# Patient Record
Sex: MALE | Race: WHITE | NOT HISPANIC OR LATINO | Employment: FULL TIME | URBAN - METROPOLITAN AREA
[De-identification: names, ages, dates, MRNs, and addresses within clinical notes are randomized per-mention and may not be internally consistent; named-entity substitution may affect disease eponyms.]

---

## 2023-10-20 ENCOUNTER — HOSPITAL ENCOUNTER (EMERGENCY)
Facility: HOSPITAL | Age: 53
Discharge: HOME | End: 2023-10-20
Attending: STUDENT IN AN ORGANIZED HEALTH CARE EDUCATION/TRAINING PROGRAM
Payer: COMMERCIAL

## 2023-10-20 ENCOUNTER — APPOINTMENT (OUTPATIENT)
Dept: RADIOLOGY | Facility: HOSPITAL | Age: 53
End: 2023-10-20
Payer: COMMERCIAL

## 2023-10-20 VITALS
HEIGHT: 73 IN | OXYGEN SATURATION: 97 % | TEMPERATURE: 97.3 F | BODY MASS INDEX: 41.75 KG/M2 | DIASTOLIC BLOOD PRESSURE: 77 MMHG | SYSTOLIC BLOOD PRESSURE: 149 MMHG | WEIGHT: 315 LBS | HEART RATE: 87 BPM | RESPIRATION RATE: 18 BRPM

## 2023-10-20 DIAGNOSIS — M25.561 ACUTE PAIN OF RIGHT KNEE: Primary | ICD-10-CM

## 2023-10-20 PROCEDURE — 2500000001 HC RX 250 WO HCPCS SELF ADMINISTERED DRUGS (ALT 637 FOR MEDICARE OP): Performed by: STUDENT IN AN ORGANIZED HEALTH CARE EDUCATION/TRAINING PROGRAM

## 2023-10-20 PROCEDURE — 73564 X-RAY EXAM KNEE 4 OR MORE: CPT | Mod: RT,FY

## 2023-10-20 PROCEDURE — 99283 EMERGENCY DEPT VISIT LOW MDM: CPT | Mod: 25 | Performed by: STUDENT IN AN ORGANIZED HEALTH CARE EDUCATION/TRAINING PROGRAM

## 2023-10-20 PROCEDURE — 73564 X-RAY EXAM KNEE 4 OR MORE: CPT | Mod: RIGHT SIDE | Performed by: RADIOLOGY

## 2023-10-20 RX ORDER — ACETAMINOPHEN 325 MG/1
975 TABLET ORAL ONCE
Status: COMPLETED | OUTPATIENT
Start: 2023-10-20 | End: 2023-10-20

## 2023-10-20 RX ORDER — CYCLOBENZAPRINE HCL 10 MG
10 TABLET ORAL 2 TIMES DAILY PRN
Qty: 5 TABLET | Refills: 0 | Status: SHIPPED | OUTPATIENT
Start: 2023-10-20 | End: 2023-10-23

## 2023-10-20 RX ORDER — CYCLOBENZAPRINE HCL 10 MG
5 TABLET ORAL ONCE
Status: COMPLETED | OUTPATIENT
Start: 2023-10-20 | End: 2023-10-20

## 2023-10-20 RX ORDER — ACETAMINOPHEN 325 MG/1
650 TABLET ORAL EVERY 6 HOURS PRN
Qty: 30 TABLET | Refills: 0 | Status: SHIPPED | OUTPATIENT
Start: 2023-10-20

## 2023-10-20 RX ADMIN — ACETAMINOPHEN 975 MG: 325 TABLET ORAL at 07:27

## 2023-10-20 RX ADMIN — CYCLOBENZAPRINE 5 MG: 10 TABLET, FILM COATED ORAL at 07:27

## 2023-10-20 ASSESSMENT — PAIN DESCRIPTION - ORIENTATION: ORIENTATION: RIGHT

## 2023-10-20 ASSESSMENT — PAIN DESCRIPTION - FREQUENCY: FREQUENCY: INTERMITTENT

## 2023-10-20 ASSESSMENT — PAIN DESCRIPTION - ONSET: ONSET: SUDDEN

## 2023-10-20 ASSESSMENT — PAIN - FUNCTIONAL ASSESSMENT: PAIN_FUNCTIONAL_ASSESSMENT: 0-10

## 2023-10-20 ASSESSMENT — COLUMBIA-SUICIDE SEVERITY RATING SCALE - C-SSRS
6. HAVE YOU EVER DONE ANYTHING, STARTED TO DO ANYTHING, OR PREPARED TO DO ANYTHING TO END YOUR LIFE?: NO
2. HAVE YOU ACTUALLY HAD ANY THOUGHTS OF KILLING YOURSELF?: NO
1. IN THE PAST MONTH, HAVE YOU WISHED YOU WERE DEAD OR WISHED YOU COULD GO TO SLEEP AND NOT WAKE UP?: NO

## 2023-10-20 ASSESSMENT — PAIN SCALES - GENERAL: PAINLEVEL_OUTOF10: 5 - MODERATE PAIN

## 2023-10-20 ASSESSMENT — PAIN DESCRIPTION - PROGRESSION: CLINICAL_PROGRESSION: NOT CHANGED

## 2023-10-20 ASSESSMENT — PAIN DESCRIPTION - LOCATION: LOCATION: KNEE

## 2023-10-20 ASSESSMENT — PAIN DESCRIPTION - PAIN TYPE: TYPE: CHRONIC PAIN

## 2023-10-20 NOTE — Clinical Note
Oscar Mcghee was seen and treated in our emergency department on 10/20/2023.  He may return to work on 10/22/2023.       If you have any questions or concerns, please don't hesitate to call.      Raul Villa MD

## 2023-10-20 NOTE — ED PROVIDER NOTES
HPI   Chief Complaint   Patient presents with   • Knee Pain     Pt BIB self for 5/10 right knee and thigh pain that started this morning while getting ready for work, Pt states Hx of chronic knee pain since injury at work and this morning is experiencing reduced gait and ROM. Pt complains of swelling, redness and tenderness on palpation.        This is a 53-year-old male presenting to the emergency department for right knee pain.  Patient states he has pain to the right knee since a fall approximately 1 month ago.  Patient fell at that time after he tripped in a pothole at work.  States he fell onto the right knee.  He has been able to ambulate though the last couple days the pain has worsened.  Pain is worse when he is standing up or sitting down and has some mild improvement when walking.  Patient does have history of a meniscal tear to the left knee and states the pain felt similar.  The pain is mainly to the medial knee.  Denies any numbness or weakness to the lower extremity.  Denies hitting his head or being on blood thinners with the initial fall.  Endorses intermittent swelling that comes and goes at home.  He has been taking naproxen for pain control.      History provided by:  Patient   used: No                        No data recorded                Patient History   History reviewed. No pertinent past medical history.  History reviewed. No pertinent surgical history.  No family history on file.  Social History     Tobacco Use   • Smoking status: Not on file   • Smokeless tobacco: Not on file   Substance Use Topics   • Alcohol use: Not on file   • Drug use: Not on file       Physical Exam   ED Triage Vitals [10/20/23 0518]   Temp Heart Rate Resp BP   36 °C (96.8 °F) 86 18 140/80      SpO2 Temp Source Heart Rate Source Patient Position   96 % Temporal Monitor Sitting      BP Location FiO2 (%)     Right arm --       Physical Exam  GEN: well appearing, no acute distress  EXT: Right knee  with small effusion, no overlying erythema, no laxity, mild tenderness to palpation medial joint line, end range of motion with flexion somewhat limited at the knee, no tenderness palpation to hip or ankle, no deformity, 2+ periph pulses in bilat radial and DP   NEURO: no focal deficits, no facial asymmetry, moving all extremities  PSYCH: AAOx3 answers questions appropriately    ED Course & MDM   ED Course as of 10/20/23 0844   Fri Oct 20, 2023   0843 X-ray of patient's knee does show degenerative changes without acute fracture or injury.  There is a small suprapatellar effusion.  Patient is able to ambulate in the emergency department.  I do feel that he is safe for discharge home.  He will be given orthopedic follow-up as necessary.  He will also be discharged home with a prescription for cyclobenzaprine. [DE]      ED Course User Index  [DE] Raul Villa MD       Medical Decision Making  This is a 53-year-old male presenting to the emergency department for right knee pain.  Patient stable upon presentation to the emergency department, no acute distress and vitals are unremarkable.  On exam patient has mild tenderness palpation to the medial joint of the knee.  There is a small effusion but otherwise no signs of deformity or overlying erythema.  Patient is neurovascular intact in the lower extremities.  Patient's symptoms do seem consistent with meniscal/soft tissue injury.  Have low suspicion for bony abnormality.  Symptoms not consistent with septic joint and I have no concern at this time.  He is still ambulatory on his knee.  Symptoms are improved when he had an Ace wrap and he is already taking naproxen.  We discussed adding Tylenol to his regimen and patient is requesting a muscle relaxer which will be provided.  X-rays to be performed today for bony injury.  If negative I do feel the patient is safe for discharge home with plans for orthopedic follow-up as necessary.    Procedure  Procedures     Raul Villa  MD  10/20/23 0844

## 2023-12-03 ENCOUNTER — HOSPITAL ENCOUNTER (EMERGENCY)
Facility: HOSPITAL | Age: 53
Discharge: HOME | End: 2023-12-03
Attending: STUDENT IN AN ORGANIZED HEALTH CARE EDUCATION/TRAINING PROGRAM
Payer: COMMERCIAL

## 2023-12-03 VITALS
SYSTOLIC BLOOD PRESSURE: 138 MMHG | TEMPERATURE: 98.4 F | OXYGEN SATURATION: 99 % | WEIGHT: 315 LBS | DIASTOLIC BLOOD PRESSURE: 78 MMHG | BODY MASS INDEX: 41.75 KG/M2 | RESPIRATION RATE: 20 BRPM | HEIGHT: 73 IN | HEART RATE: 87 BPM

## 2023-12-03 DIAGNOSIS — J01.00 ACUTE NON-RECURRENT MAXILLARY SINUSITIS: Primary | ICD-10-CM

## 2023-12-03 DIAGNOSIS — R05.1 ACUTE COUGH: ICD-10-CM

## 2023-12-03 LAB
FLUAV RNA RESP QL NAA+PROBE: NOT DETECTED
FLUBV RNA RESP QL NAA+PROBE: NOT DETECTED
SARS-COV-2 RNA RESP QL NAA+PROBE: NOT DETECTED

## 2023-12-03 PROCEDURE — 87636 SARSCOV2 & INF A&B AMP PRB: CPT | Performed by: PHYSICIAN ASSISTANT

## 2023-12-03 PROCEDURE — 99283 EMERGENCY DEPT VISIT LOW MDM: CPT | Performed by: STUDENT IN AN ORGANIZED HEALTH CARE EDUCATION/TRAINING PROGRAM

## 2023-12-03 RX ORDER — DOXYCYCLINE 100 MG/1
100 CAPSULE ORAL 2 TIMES DAILY
Qty: 14 CAPSULE | Refills: 0 | Status: SHIPPED | OUTPATIENT
Start: 2023-12-03 | End: 2023-12-10

## 2023-12-03 ASSESSMENT — LIFESTYLE VARIABLES
HAVE PEOPLE ANNOYED YOU BY CRITICIZING YOUR DRINKING: NO
EVER FELT BAD OR GUILTY ABOUT YOUR DRINKING: NO
EVER HAD A DRINK FIRST THING IN THE MORNING TO STEADY YOUR NERVES TO GET RID OF A HANGOVER: NO
REASON UNABLE TO ASSESS: NO
HAVE YOU EVER FELT YOU SHOULD CUT DOWN ON YOUR DRINKING: NO

## 2023-12-03 ASSESSMENT — COLUMBIA-SUICIDE SEVERITY RATING SCALE - C-SSRS
2. HAVE YOU ACTUALLY HAD ANY THOUGHTS OF KILLING YOURSELF?: NO
6. HAVE YOU EVER DONE ANYTHING, STARTED TO DO ANYTHING, OR PREPARED TO DO ANYTHING TO END YOUR LIFE?: NO
1. IN THE PAST MONTH, HAVE YOU WISHED YOU WERE DEAD OR WISHED YOU COULD GO TO SLEEP AND NOT WAKE UP?: NO

## 2023-12-03 NOTE — DISCHARGE INSTRUCTIONS
Viral swabs for flu and COVID are negative.  Please take the antibiotics twice daily until completion suspect you have a sinusitis.  Should your symptoms worsen including worsening shortness of breath new shortness of breath chest pain fevers chills symptoms concerning to increased work of breathing call 911 or return immediately.  Otherwise follow-up with your primary provider in the coming days.

## 2023-12-03 NOTE — ED PROVIDER NOTES
EMERGENCY DEPARTMENT ENCOUNTER      Pt Name: Oscar Mcghee  MRN: [id number]  Birthdate 1970  Date of evaluation: 12/3/2023  Provider: Xavier Casas DO    CHIEF COMPLAINT       Chief Complaint   Patient presents with    Flu Symptoms     C/o cough, congestion, sore throat, body aches, burning around nose       HISTORY OF PRESENT ILLNESS    Oscar Mcghee is a 53 y.o. male who presents to the emergency department with Himself for flulike symptoms for approximately 1 week.  Patient reports congestion and nonproductive cough subjective fevers no measured fevers.  Denies any chest pain or shortness of breath.  His spouse is at home with similar symptoms as well.  He did have a video physician appointment few days back was diagnosed with viral infection recommended Mucinex as well as Tessalon Perles.  His symptoms have not improved.  He does have sinus pressure as well.  Denies any further associated symptoms.          Nursing Notes were reviewed.    REVIEW OF SYSTEMS     CONSTITUTIONAL: Endorses subjective fevers.  Denies sweats, chills.   NEURO: Denies difficulty walking, numbness, weakness, tingling, headache.   HEENT: Endorses rhinorrhea.  Denies sore throat, changes in vision.   CARDIO: Denies chest pain, palpitations.  PULM: Endorses cough.  Denies shortness of breath  GI: Denies abdominal pain, nausea, vomiting, diarrhea, constipation, melena, hematochezia.  : Denies painful urination, frequency, hematuria.   MSK: Denies recent trauma.   SKIN: Denies rash, lesions.   ENDOCRINE: Denies unexpected weight-loss.   HEME: Denies bleeding disorder.     PAST MEDICAL HISTORY   History reviewed. No pertinent past medical history.    SURGICAL HISTORY     History reviewed. No pertinent surgical history.    ALLERGIES     Amoxicillin    FAMILY HISTORY     No family history on file.  Reviewed and not pertinent  SOCIAL HISTORY       Social History     Socioeconomic History    Marital status:      Spouse name: None     Number of children: None    Years of education: None    Highest education level: None   Occupational History    None   Tobacco Use    Smoking status: None    Smokeless tobacco: None   Substance and Sexual Activity    Alcohol use: None    Drug use: None    Sexual activity: None   Other Topics Concern    None   Social History Narrative    None     Social Determinants of Health     Financial Resource Strain: Not on file   Food Insecurity: Not on file   Transportation Needs: Not on file   Physical Activity: Not on file   Stress: Not on file   Social Connections: Not on file   Intimate Partner Violence: Not on file   Housing Stability: Not on file       PHYSICAL EXAM   VS: As documented in the triage note from today's date and EMR flowsheet were reviewed.  Gen: Well developed. No acute distress. Seated in bed. Appears nontoxic.   Skin: Warm. Dry. Intact. No rashes or lesions.  Eyes: Pupils equally round and reactive to light. Clear sclera. EOMI.  HENT: Atraumatic appearance. Mucosal membranes moist. No oral lesions, uvula midline, airway patent.  TMs clear bilaterally nares clear bilaterally.  No tonsillar exudates or stones no evidence of PTA or Merlyn's.  No meningismal signs trachea is midline.  Maxillary sinus tenderness bilaterally.  CV: Regular rate and regular rhythm. S1, S2. No pedal edema. Warm extremities.  Resp: Nonlabored breathing Clear to auscultation bilaterally. No increased work of breathing.   GI: Soft and nontender. No rebound or guarding. Bowel sounds x4 present.   MSK: Symmetric muscle bulk. No joint swelling in the extremities. Compartments are soft. Neurovascularly intact x4 extremities. Radial pulses +2 equal bilaterally.   Neuro: Alert. CN II - XII intact. Speech fluent. Moving all extremities. No focal deficits. Gait normal.  Psych: Appropriate. Kempt.    DIAGNOSTIC RESULTS       RADIOLOGY:   Non-plain film images such as CT, Ultrasound and MRI are read by the radiologist. Plain radiographic  images are visualized and preliminarily interpreted by the emergency physician with the below findings:      Interpretation per the Radiologist below, if available at the time of this note:    No orders to display         ED BEDSIDE ULTRASOUND:   Performed by ED Physician - none    LABS:  Labs Reviewed   SARS-COV-2 PCR, SYMPTOMATIC - Normal       Result Value    Coronavirus 2019, PCR Not Detected      Narrative:     This assay has received FDA Emergency Use Authorization (EUA) and is only authorized for the duration of time that circumstances exist to justify the authorization of the emergency use of in vitro diagnostic tests for the detection of SARS-CoV-2 virus and/or diagnosis of COVID-19 infection under section 564(b)(1) of the Act, 21 U.S.C. 360bbb-3(b)(1). This assay is an in vitro diagnostic nucleic acid amplification test for the qualitative detection of SARS-CoV-2 from nasopharyngeal specimens and has been validated for use at [facility]. Negative results do not preclude COVID-19 infections and should not be used as the sole basis for diagnosis, treatment, or other management decisions.     INFLUENZA A AND B PCR - Normal    Flu A Result Not Detected      Flu B Result Not Detected      Narrative:     This assay is an in vitro diagnostic multiplex nucleic acid amplification test for the detection and discrimination of Influenza A & B from nasopharyngeal specimens, and has been validated for use at [facility]. Negative results do not preclude Influenza A/B infections, and should not be used as the sole basis for diagnosis, treatment, or other management decisions. If Influenza A/B and RSV PCR results are negative, testing for Parainfluenza virus, Adenovirus and Metapneumovirus is routinely performed for [facility] [place]ology and intensive care inpatients, and is available on other patients by placing an add-on request.       All other labs were within normal  "range or not returned as of this dictation.    EMERGENCY DEPARTMENT COURSE/MDM:   Vitals:    Vitals:    12/03/23 1552 12/03/23 1555 12/03/23 1713   BP:  135/77 138/78   BP Location:  Right arm Right arm   Patient Position:  Sitting Sitting   Pulse:  87    Resp: 22 22 20   Temp: 36.9 °C (98.4 °F)     TempSrc: Temporal     SpO2:  98% 99%   Weight:  (!) 159 kg (350 lb)    Height:  1.854 m (6' 1\")        I reviewed the patient's triage vitals and they are within normal range.    Due to the above findings the following was ordered viral swabs.    Patient does report his blood sugars have been running within normal range.    Patient overall very well-appearing in no respiratory distress no focal findings on auscultatory exam no indication for chest x-ray at this time.  Viral swabs are negative for flu and COVID.  He is overall well-appearing does have tenderness over the maxillary sinus as well as frontal sinus.  Does have an allergy to penicillin we will treat with doxycycline for suspected acute sinusitis which is likely causing postnasal drainage subsequently causing cough..  In the rare event that he does have any atypical pneumonia this will be covered with doxycycline as well.  He is given strict return precautions appropriate follow-up and discharged in stable condition  Was appreciative of care and agreeable with plan.  Diagnoses as of 12/05/23 0107   Acute non-recurrent maxillary sinusitis   Acute cough       Patient was counseled regarding labs, imaging, likely diagnosis, and plan. All questions were answered.     ------------------------------------------------------------------  Information provided by the patient  Past medical history complicating workup diabetic  Previous medical records reviewed telemed visit 11/28/2023.  Considered chest x-ray although clear lung sounds to auscultatory exam bilaterally.    ------------------------------------------------------------------  ED Medications administered this " visit:  Medications - No data to display    New Prescriptions from this visit:    Discharge Medication List as of 12/3/2023  5:04 PM          Follow-up:  Yelena Morejon MD  [address] [address]  [facility] [address] [phone]    Schedule an appointment as soon as possible for a visit in 2 days      [facility]  [address]  [facility] [address] [phone]  Go to   If symptoms worsen        Final Impression:   1. Acute non-recurrent maxillary sinusitis    2. Acute cough          Xavier Casas DO    (Please note that portions of this note were completed with a voice recognition program.  Efforts were made to edit the dictations but occasionally words are mis-transcribed.)     Xavier Casas DO  12/05/23 0109

## 2024-02-22 ENCOUNTER — TELEMEDICINE (OUTPATIENT)
Dept: PRIMARY CARE | Facility: CLINIC | Age: 54
End: 2024-02-22
Payer: COMMERCIAL

## 2024-02-22 DIAGNOSIS — G47.33 OSA ON CPAP: ICD-10-CM

## 2024-02-22 DIAGNOSIS — R09.89 SYMPTOMS OF UPPER RESPIRATORY INFECTION (URI): Primary | ICD-10-CM

## 2024-02-22 PROCEDURE — 99213 OFFICE O/P EST LOW 20 MIN: CPT | Performed by: NURSE PRACTITIONER

## 2024-02-22 ASSESSMENT — ENCOUNTER SYMPTOMS
SHORTNESS OF BREATH: 0
FATIGUE: 1
HEADACHES: 0
TROUBLE SWALLOWING: 0
WHEEZING: 0
CARDIOVASCULAR NEGATIVE: 1

## 2024-02-22 NOTE — ASSESSMENT & PLAN NOTE
Hx and limited exam are c/w viral URI.  Reviewed typical course, supportive care.  NO Afrin, add Mucinex DM.  Move around to allow secretions to drain, fluids.  Reviewed symptoms that require an in person  re-evaluation

## 2024-02-22 NOTE — PROGRESS NOTES
Subjective   Patient ID: Oscar Mcghee is a 53 y.o. male who presents for URI.  Nasal congestion x 3-4 days,  Uses Xyzal, Afrin  No T, no resp sx.  Needs work note  as uses nasal pillow for CPAP and as slept poorly due to URI symptoms - does not tolerate mask.        Review of Systems   Constitutional:  Positive for fatigue.   HENT:  Positive for congestion. Negative for trouble swallowing.    Respiratory:  Negative for shortness of breath and wheezing.    Cardiovascular: Negative.    Neurological:  Negative for headaches.       Objective   Physical Exam  Constitutional:       Appearance: He is obese.   HENT:      Nose: Congestion (some evident) present.   Pulmonary:      Effort: Pulmonary effort is normal.      Comments: No conversational dyspnea  Musculoskeletal:      Cervical back: Neck supple.   Neurological:      Mental Status: He is oriented to person, place, and time.         Assessment/Plan            ANEUDY Avila-CNP 02/22/24 10:42 AM